# Patient Record
Sex: MALE | Race: WHITE | URBAN - METROPOLITAN AREA
[De-identification: names, ages, dates, MRNs, and addresses within clinical notes are randomized per-mention and may not be internally consistent; named-entity substitution may affect disease eponyms.]

---

## 2021-05-24 ENCOUNTER — TELEPHONE ENCOUNTER (OUTPATIENT)
Dept: URBAN - METROPOLITAN AREA CLINIC 92 | Facility: CLINIC | Age: 66
End: 2021-05-24

## 2021-05-25 ENCOUNTER — TELEPHONE ENCOUNTER (OUTPATIENT)
Dept: URBAN - METROPOLITAN AREA CLINIC 74 | Facility: CLINIC | Age: 66
End: 2021-05-25

## 2021-06-07 ENCOUNTER — WEB ENCOUNTER (OUTPATIENT)
Dept: URBAN - METROPOLITAN AREA CLINIC 74 | Facility: CLINIC | Age: 66
End: 2021-06-07

## 2021-06-07 ENCOUNTER — OFFICE VISIT (OUTPATIENT)
Dept: URBAN - METROPOLITAN AREA CLINIC 74 | Facility: CLINIC | Age: 66
End: 2021-06-07
Payer: MEDICARE

## 2021-06-07 VITALS
SYSTOLIC BLOOD PRESSURE: 134 MMHG | HEART RATE: 58 BPM | DIASTOLIC BLOOD PRESSURE: 70 MMHG | HEIGHT: 72 IN | TEMPERATURE: 97.2 F | BODY MASS INDEX: 35.81 KG/M2 | OXYGEN SATURATION: 95 % | WEIGHT: 264.4 LBS

## 2021-06-07 DIAGNOSIS — K61.1 RECTAL ABSCESS: ICD-10-CM

## 2021-06-07 DIAGNOSIS — K60.2 ANAL FISSURE: ICD-10-CM

## 2021-06-07 PROBLEM — 197166005: Status: ACTIVE | Noted: 2021-06-07

## 2021-06-07 PROCEDURE — 99204 OFFICE O/P NEW MOD 45 MIN: CPT | Performed by: STUDENT IN AN ORGANIZED HEALTH CARE EDUCATION/TRAINING PROGRAM

## 2021-06-07 NOTE — PHYSICAL EXAM GASTROINTESTINAL
Abdomen , soft, nontender, nondistended , no guarding or rigidity , no masses palpable , normal bowel sounds , Liver and Spleen , no hepatomegaly present , no hepatosplenomegaly , liver nontender , spleen not palpable , Rectal , normal sphincter tone , no internal hemorrhoids, rectal masses or bleeding present, tenderness on perirectal buttock area with some induration felt, tenderness on finger insertion with mucosal break suggestive of anal fissure in posterior direction. No blood on finger. No mass felt.

## 2021-06-07 NOTE — HPI-TODAY'S VISIT:
66-year-old male Comes in for evaluation of anal pain Patient stated that about 3 weeks back he had an episode of straining.  He had to push really hard and he felt like something did tear. Since then he has been having severe pain in the area.  This pain is more worse with bowel movement.  Bowels now are no more hard but sometimes he does have to strain.  Sees blood on the toilet paper but none in the stool.  No mucus in the stool.  No fever or chills.  Also reports pain in the buttock area. Otherwise no pain in the upper abdomen.  No nausea or vomiting.  No reflux or dysphagia.  Overall good appetite and denies any unintentional weight loss. Patient also had a CT abdomen pelvis which was negative for any acute findings specially in the perianal area. Patient has been using topical hemorrhoidal cream with partial relief only.

## 2021-06-16 ENCOUNTER — TELEPHONE ENCOUNTER (OUTPATIENT)
Dept: URBAN - METROPOLITAN AREA SURGERY CENTER 30 | Facility: SURGERY CENTER | Age: 66
End: 2021-06-16

## 2021-07-01 ENCOUNTER — OFFICE VISIT (OUTPATIENT)
Dept: URBAN - METROPOLITAN AREA CLINIC 74 | Facility: CLINIC | Age: 66
End: 2021-07-01

## 2021-07-19 ENCOUNTER — OFFICE VISIT (OUTPATIENT)
Dept: URBAN - METROPOLITAN AREA CLINIC 74 | Facility: CLINIC | Age: 66
End: 2021-07-19

## 2021-08-28 ENCOUNTER — TELEPHONE ENCOUNTER (OUTPATIENT)
Dept: URBAN - METROPOLITAN AREA CLINIC 13 | Facility: CLINIC | Age: 66
End: 2021-08-28

## 2021-08-29 ENCOUNTER — TELEPHONE ENCOUNTER (OUTPATIENT)
Dept: URBAN - METROPOLITAN AREA CLINIC 13 | Facility: CLINIC | Age: 66
End: 2021-08-29

## 2021-10-15 ENCOUNTER — OFFICE VISIT (OUTPATIENT)
Dept: URBAN - METROPOLITAN AREA CLINIC 40 | Facility: CLINIC | Age: 66
End: 2021-10-15
Payer: MEDICARE

## 2021-10-15 VITALS
BODY MASS INDEX: 34.67 KG/M2 | TEMPERATURE: 97.9 F | SYSTOLIC BLOOD PRESSURE: 126 MMHG | HEIGHT: 72 IN | DIASTOLIC BLOOD PRESSURE: 70 MMHG | WEIGHT: 256 LBS | HEART RATE: 68 BPM

## 2021-10-15 DIAGNOSIS — K64.8 INTERNAL HEMORRHOIDS: ICD-10-CM

## 2021-10-15 DIAGNOSIS — K60.2 ANAL FISSURE: ICD-10-CM

## 2021-10-15 PROCEDURE — 99212 OFFICE O/P EST SF 10 MIN: CPT | Performed by: INTERNAL MEDICINE

## 2021-10-15 RX ORDER — METHYLPREDNISOLONE 4 MG/1
1 TABLET WITH FOOD OR MILK TABLET ORAL
Status: ACTIVE | COMMUNITY

## 2021-10-15 RX ORDER — CELECOXIB 200 MG/1
1 CAPSULE WITH FOOD CAPSULE ORAL ONCE A DAY
Status: ACTIVE | COMMUNITY

## 2021-10-15 RX ORDER — NAPROXEN 500 MG/1
1 TABLET AS NEEDED TABLET, DELAYED RELEASE ORAL
Status: ACTIVE | COMMUNITY

## 2021-10-15 RX ORDER — BUSPIRONE HYDROCHLORIDE 10 MG/1
1 TABLET TABLET ORAL TWICE A DAY
Status: ACTIVE | COMMUNITY

## 2021-10-15 RX ORDER — ONDANSETRON 4 MG/1
1 TABLET ON THE TONGUE AND ALLOW TO DISSOLVE TABLET, ORALLY DISINTEGRATING ORAL ONCE A DAY
Status: ACTIVE | COMMUNITY

## 2021-10-15 RX ORDER — NITROGLYCERIN 0.4 MG/1
AS DIRECTED TABLET SUBLINGUAL
Status: ACTIVE | COMMUNITY

## 2021-10-15 RX ORDER — OXYCODONE HYDROCHLORIDE AND ACETAMINOPHEN 10; 325 MG/1; MG/1
1 TABLET AS NEEDED TABLET ORAL
Status: ACTIVE | COMMUNITY

## 2021-10-15 RX ORDER — PAROXETINE HYDROCHLORIDE HEMIHYDRATE 20 MG/1
1 TABLET IN THE MORNING TABLET, FILM COATED ORAL ONCE A DAY
Status: ACTIVE | COMMUNITY

## 2021-10-15 NOTE — HPI-TODAY'S VISIT:
66-year-old male Comes in for evaluation of anal pain Patient stated that about 6 weeks back he had an episode of straining.  He had to push really hard and he felt like something did tear. Since then he has been having severe pain in the area.  This pain is more worse with bowel movement.  Bowels now are no more hard but sometimes he does have to strain.  Sees blood on the toilet paper but none in the stool.  No mucus in the stool.  No fever or chills.  Also reports pain in the buttock area. Otherwise no pain in the upper abdomen.  No nausea or vomiting.  No reflux or dysphagia.  Overall good appetite and denies any unintentional weight loss. Patient also had a CT abdomen pelvis which was negative for any acute findings specially in the perianal area. NTG ointment is not helping

## 2021-10-15 NOTE — PHYSICAL EXAM GASTROINTESTINAL
Abdomen , soft, nontender, nondistended , no guarding or rigidity , no masses palpable , normal bowel sounds , Liver and Spleen , no hepatomegaly present , no hepatosplenomegaly , liver nontender , spleen not palpable. rectal: posterior anal fissure. skin tags. anoscopy: Grade I RA/LL/RP hemorrhoids

## 2021-10-18 ENCOUNTER — OFFICE VISIT (OUTPATIENT)
Dept: URBAN - METROPOLITAN AREA CLINIC 40 | Facility: CLINIC | Age: 66
End: 2021-10-18

## 2021-12-10 ENCOUNTER — OFFICE VISIT (OUTPATIENT)
Dept: URBAN - METROPOLITAN AREA CLINIC 40 | Facility: CLINIC | Age: 66
End: 2021-12-10

## 2022-01-23 ENCOUNTER — DASHBOARD ENCOUNTERS (OUTPATIENT)
Age: 67
End: 2022-01-23

## 2022-01-24 ENCOUNTER — OFFICE VISIT (OUTPATIENT)
Dept: URBAN - METROPOLITAN AREA CLINIC 40 | Facility: CLINIC | Age: 67
End: 2022-01-24

## 2022-01-24 RX ORDER — NITROGLYCERIN 0.4 MG/1
AS DIRECTED TABLET SUBLINGUAL
Status: ACTIVE | COMMUNITY

## 2022-01-24 RX ORDER — PAROXETINE HYDROCHLORIDE HEMIHYDRATE 20 MG/1
1 TABLET IN THE MORNING TABLET, FILM COATED ORAL ONCE A DAY
Status: ACTIVE | COMMUNITY

## 2022-01-24 RX ORDER — BUSPIRONE HYDROCHLORIDE 10 MG/1
1 TABLET TABLET ORAL TWICE A DAY
Status: ACTIVE | COMMUNITY

## 2022-01-24 RX ORDER — OXYCODONE HYDROCHLORIDE AND ACETAMINOPHEN 10; 325 MG/1; MG/1
1 TABLET AS NEEDED TABLET ORAL
Status: ACTIVE | COMMUNITY

## 2022-01-24 RX ORDER — ONDANSETRON 4 MG/1
1 TABLET ON THE TONGUE AND ALLOW TO DISSOLVE TABLET, ORALLY DISINTEGRATING ORAL ONCE A DAY
Status: ACTIVE | COMMUNITY

## 2022-01-24 RX ORDER — NAPROXEN 500 MG/1
1 TABLET AS NEEDED TABLET, DELAYED RELEASE ORAL
Status: ACTIVE | COMMUNITY

## 2022-01-24 RX ORDER — CELECOXIB 200 MG/1
1 CAPSULE WITH FOOD CAPSULE ORAL ONCE A DAY
Status: ACTIVE | COMMUNITY

## 2022-01-24 RX ORDER — METHYLPREDNISOLONE 4 MG/1
1 TABLET WITH FOOD OR MILK TABLET ORAL
Status: ACTIVE | COMMUNITY

## 2025-02-02 ENCOUNTER — P2P PATIENT RECORD (OUTPATIENT)
Age: 70
End: 2025-02-02